# Patient Record
Sex: FEMALE | Race: BLACK OR AFRICAN AMERICAN | Employment: PART TIME | URBAN - METROPOLITAN AREA
[De-identification: names, ages, dates, MRNs, and addresses within clinical notes are randomized per-mention and may not be internally consistent; named-entity substitution may affect disease eponyms.]

---

## 2019-09-02 ENCOUNTER — HOSPITAL ENCOUNTER (EMERGENCY)
Facility: CLINIC | Age: 24
Discharge: HOME | End: 2019-09-02
Attending: EMERGENCY MEDICINE | Admitting: EMERGENCY MEDICINE

## 2019-09-02 VITALS
BODY MASS INDEX: 17.06 KG/M2 | HEART RATE: 46 BPM | OXYGEN SATURATION: 100 % | RESPIRATION RATE: 18 BRPM | SYSTOLIC BLOOD PRESSURE: 109 MMHG | DIASTOLIC BLOOD PRESSURE: 68 MMHG | HEIGHT: 67 IN | TEMPERATURE: 98.3 F | WEIGHT: 108.69 LBS

## 2019-09-02 DIAGNOSIS — G43.001 MIGRAINE WITHOUT AURA AND WITH STATUS MIGRAINOSUS, NOT INTRACTABLE: ICD-10-CM

## 2019-09-02 PROCEDURE — 25000128 H RX IP 250 OP 636: Performed by: EMERGENCY MEDICINE

## 2019-09-02 PROCEDURE — 96375 TX/PRO/DX INJ NEW DRUG ADDON: CPT

## 2019-09-02 PROCEDURE — 25000132 ZZH RX MED GY IP 250 OP 250 PS 637: Performed by: EMERGENCY MEDICINE

## 2019-09-02 PROCEDURE — 99284 EMERGENCY DEPT VISIT MOD MDM: CPT | Mod: 25

## 2019-09-02 PROCEDURE — 96361 HYDRATE IV INFUSION ADD-ON: CPT

## 2019-09-02 PROCEDURE — 96374 THER/PROPH/DIAG INJ IV PUSH: CPT

## 2019-09-02 PROCEDURE — 25800030 ZZH RX IP 258 OP 636: Performed by: EMERGENCY MEDICINE

## 2019-09-02 RX ORDER — KETOROLAC TROMETHAMINE 15 MG/ML
15 INJECTION, SOLUTION INTRAMUSCULAR; INTRAVENOUS ONCE
Status: COMPLETED | OUTPATIENT
Start: 2019-09-02 | End: 2019-09-02

## 2019-09-02 RX ORDER — DIPHENHYDRAMINE HYDROCHLORIDE 50 MG/ML
12.5 INJECTION INTRAMUSCULAR; INTRAVENOUS ONCE
Status: COMPLETED | OUTPATIENT
Start: 2019-09-02 | End: 2019-09-02

## 2019-09-02 RX ORDER — ONDANSETRON 2 MG/ML
4 INJECTION INTRAMUSCULAR; INTRAVENOUS ONCE
Status: COMPLETED | OUTPATIENT
Start: 2019-09-02 | End: 2019-09-02

## 2019-09-02 RX ORDER — ACETAMINOPHEN 500 MG
1000 TABLET ORAL ONCE
Status: COMPLETED | OUTPATIENT
Start: 2019-09-02 | End: 2019-09-02

## 2019-09-02 RX ORDER — METOCLOPRAMIDE HYDROCHLORIDE 5 MG/ML
10 INJECTION INTRAMUSCULAR; INTRAVENOUS ONCE
Status: COMPLETED | OUTPATIENT
Start: 2019-09-02 | End: 2019-09-02

## 2019-09-02 RX ORDER — METOCLOPRAMIDE 10 MG/1
10 TABLET ORAL 3 TIMES DAILY PRN
Qty: 15 TABLET | Refills: 0 | Status: SHIPPED | OUTPATIENT
Start: 2019-09-02

## 2019-09-02 RX ADMIN — METOCLOPRAMIDE 10 MG: 5 INJECTION, SOLUTION INTRAMUSCULAR; INTRAVENOUS at 15:26

## 2019-09-02 RX ADMIN — DIPHENHYDRAMINE HYDROCHLORIDE 12.5 MG: 50 INJECTION, SOLUTION INTRAMUSCULAR; INTRAVENOUS at 15:23

## 2019-09-02 RX ADMIN — ACETAMINOPHEN 1000 MG: 500 TABLET, FILM COATED ORAL at 15:22

## 2019-09-02 RX ADMIN — SODIUM CHLORIDE, POTASSIUM CHLORIDE, SODIUM LACTATE AND CALCIUM CHLORIDE 1000 ML: 600; 310; 30; 20 INJECTION, SOLUTION INTRAVENOUS at 15:26

## 2019-09-02 RX ADMIN — ONDANSETRON 4 MG: 2 INJECTION INTRAMUSCULAR; INTRAVENOUS at 15:25

## 2019-09-02 RX ADMIN — KETOROLAC TROMETHAMINE 15 MG: 15 INJECTION, SOLUTION INTRAMUSCULAR; INTRAVENOUS at 15:24

## 2019-09-02 ASSESSMENT — ENCOUNTER SYMPTOMS
NAUSEA: 1
PHOTOPHOBIA: 1
ARTHRALGIAS: 1
VOMITING: 0
HEADACHES: 1

## 2019-09-02 ASSESSMENT — MIFFLIN-ST. JEOR: SCORE: 1270.63

## 2019-09-02 NOTE — ED PROVIDER NOTES
"  History     Chief Complaint:  Headache    HPI   Neftaly Rehman is a 23 year old female who presents with headache. The patient reported that her \"15/10\" left sided throbbing temporal headache started approximately 4 days ago. She denied any falls or injuries. She stated that she has a history of chronic migraines and although she has not had one for approximately 5 years this feels like a migraine. Occasionally the pain radiates to her forehead and the back of her head. For pain management the patient has taken ibuprofen and Advil which alleviate the pain but do not make it go away and she did not take any today.  Additionally she reports nausea, left shoulder pain and photophobia and denies vomiting or other extremity pain. No numbness, tingling, or weakness. No lethargy, seizures, or other symptoms. No neck pain or stiffness.    Allergies:  Asprin     Medications:    The patient has no known drug allergies.     Past Medical History:    ADD  Migraine   Impingement syndrome shoulder    Past Surgical History:    The patient does not have any pertinent past surgical history.    Family History:    No past pertinent family history.    Social History:  Negative for tobacco use.  Alcohol use endorsed   No marijuana use in the last 30 days   Marital Status:  Single [1]     Review of Systems   Eyes: Positive for photophobia.   Gastrointestinal: Positive for nausea. Negative for vomiting.   Musculoskeletal: Positive for arthralgias.   Neurological: Positive for headaches.   All other systems reviewed and are negative.      Physical Exam     Patient Vitals for the past 24 hrs:   BP Temp Temp src Pulse Heart Rate Resp SpO2 Height Weight   09/02/19 1530 109/68 -- -- (!) 46 -- -- 100 % -- --   09/02/19 1408 119/80 98.3  F (36.8  C) Oral -- (!) 46 18 100 % 1.702 m (5' 7\") 49.3 kg (108 lb 11 oz)       Physical Exam  General: Nontoxic. Heating pad on forehead.   Head:  Scalp, face, and head appear normal, atraumatic, non " tender to palpation. No tenderness over temporal arteries.  Eyes:  Pupils are equal, round, and reactive to light, EOMI, no nystagmus   Conjunctivae non-injected and sclerae white  ENT:    The external nose is normal    Pinnae are normal    The oropharynx is normal, mucous membranes moist    Uvula is in the midline  Neck:  Normal range of motion    There is no rigidity noted    Trachea is in the midline  CV:  Regular rate and rhythm     Normal S1/S2, no S3/S4    No murmur or rub. Radial pulses 2+ bilaterally  Resp:  Lungs are clear and equal bilaterally    There is no tachypnea    No increased work of breathing    No rales, wheezing, or rhonchi  GI:  Abdomen is soft, no rigidity or guarding    No distension, or mass    No tenderness or rebound tenderness   MS:  Normal muscular tone    Symmetric motor strength    No lower extremity edema  Skin:  No rash or acute skin lesions noted  Neuro: A&Ox3, GCS 15    CN II - XII intact    Speech clear, fluent, and normal    Strength 5/5 and symmetric in bilateral upper and lower extremities.    No pronator drift. No leg drift. SILT throughout.    No ankle clonus    FTN testing normal. No tremor.     No meningismus   Psych:  Normal affect.  Appropriate interactions.      Emergency Department Course   Interventions:  1522 Tylenol 1G PO   1523 Benadryl 12.5mg IV  1524 Toradol 15mg IV   1525 Zofran 4mg IV   1526 NS 1L IV    Reglan 10mg IV     Emergency Department Course:  Nursing notes and vitals reviewed. (1442) I performed an exam of the patient as documented above.     Medicine administered as documented above.   (9858) I rechecked the patient and discussed the results of her workup thus far.     Findings and plan explained to the Patient. Patient discharged home with instructions regarding supportive care, medications, and reasons to return. The importance of close follow-up was reviewed. The patient was prescribed Reglan.    I personally reviewed the laboratory results with the  Patient and answered all related questions prior to discharge.     Impression & Plan    Medical Decision Making:  Neftaly Rehman is a 23 year old female who presents to the ED for evaluation of a frontal left sided headache. She does have a prior history of similar headaches. On my evaluation she is well appearing and afebrile. She has a normal neurological examination without any focal neurologic deficits. There is not a history or evidence of trauma or injury. The patient has not had any fever, weakness, numbness, paresthesia, neck stiffness or confusion. Meningitis/encephalitis, intracranial hemorrhage, subarachnoid hemorrhage, CNS tumor, temporal arteritis, idiopathic intracranial hypertension are considered as part of the differential, and considered unlikely given the history and physical examination. At this time there was not an indication for emergent neuroimaging. Symptomatic treatment was administered as noted above and the patient felt improved. At this point I feel the patient's symptoms are due to migraine headache. This and the findings of my evaluation was discussed with the patient. I recommended the patient should follow-up with her primary physician within 3 days if symptoms continue. If the headache continues or the frequency increases, consultation with neurology may be indicated.  Return precautions including increasing pain, fever, vomiting, neck stiffness, syncope, numbness or weakness, were discussed with the patient.  Medications for home prescribed as noted below. All questions were answered prior to the patient's discharge. She was in agreement with the plan of care and she was discharged in stable condition.    Diagnosis:    ICD-10-CM    1. Migraine without aura and with status migrainosus, not intractable G43.001        Disposition:  discharged to home    Discharge Medications:  New Prescriptions    METOCLOPRAMIDE (REGLAN) 10 MG TABLET    Take 1 tablet (10 mg) by mouth 3 times daily  as needed (Headache, Nausea or Vomiting)     Scribe Disclosure:  I, Shaista Joyce, am serving as a scribe on 9/2/2019 at 2:42 PM to personally document services performed by Dean Terry MD based on my observations and the provider's statements to me.     Shaista Joyce  9/2/2019   [facility] EMERGENCY DEPARTMENT       Dean Terry MD  09/03/19 1243

## 2019-09-02 NOTE — ED AVS SNAPSHOT
[facility] Emergency Department  [address]  [facility] [address]  Phone:  [phone]  Fax:  [phone]                                    Neftaly Rehman   MRN: [id number]    Department:  [facility] Emergency Department   Date of Visit:  9/2/2019           After Visit Summary Signature Page    I have received my discharge instructions, and my questions have been answered. I have discussed any challenges I see with this plan with the nurse or doctor.    ..........................................................................................................................................  Patient/Patient Representative Signature      ..........................................................................................................................................  Patient Representative Print Name and Relationship to Patient    ..................................................               ................................................  Date                                   Time    ..........................................................................................................................................  Reviewed by Signature/Title    ...................................................              ..............................................  Date                                               Time          22EPIC Rev 08/18

## 2019-09-02 NOTE — DISCHARGE INSTRUCTIONS
Discharge Instructions  Migraine    You were seen today for a headache that your provider thinks is likely a migraine. At this time your provider does not find that your headache is a sign of anything dangerous or life-threatening.  However, sometimes the signs of serious illness do not show up right away.      Generally, every Emergency Department visit should have a follow-up clinic visit with either a primary or a specialty clinic/provider. Please follow-up as instructed by your emergency provider today.    Return to the Emergency Department if:  You get a fever of 100.4 F or higher.  You get a stiff neck with your headache.  You get a new headache that is different or worse than headaches you have had before.  You are vomiting (throwing up) and cannot keep food or water down.  You have blurry or double vision or other problems with your eyes.  You have a new weakness on one side of your body.  You have difficulty with balance which is new.  You or your family thinks you are confused.  You have a seizure.    Treatment:  Often, treatment for your migraine will take some time to make you headache stop.  Going home to sleep can be very effective.  Use your medications as directed; overuse of medications can actually cause headaches.  Once your headache has gone away, avoid triggers such as certain foods, skipping meals, bright lights, changes in sleep, exercise and stress.  Migraine headaches can have symptoms before the pain starts, like vision changes, funny smells/tastes, dizziness or other symptoms.  Treating a headache as soon as the first symptoms come on is very important and gives the best chance of stopping the headache.  If headaches are severe or frequent, you may need to start daily medication to prevent the headaches.  Carbon monoxide can cause headaches, so not burning things in your home is important.  Also get a carbon monoxide detector.  Some medications for migraines may raise your blood pressure,  so use with caution if you have high blood pressure or heart problems.  If you were given a prescription for medicine here today, be sure to read all of the information (including the package insert) that comes with your prescription.  This will include important information about the medicine, its side effects, and any warnings that you need to know about.  The pharmacist who fills the prescription can provide more information and answer questions you may have about the medicine.  If you have questions or concerns that the pharmacist cannot address, please call or return to the Emergency Department.   Remember that you can always come back to the Emergency Department if you are not able to see your regular provider in the amount of time listed above, if you get any new symptoms, or if there is anything that worries you.

## 2019-09-02 NOTE — LETTER
September 2, 2019      To Whom It May Concern:      Neftaly Rehman was seen in our Emergency Department today, 09/02/19.  I expect her condition to improve over the next 1-2 days.  She may return to work when improved.    Sincerely,        Elvira Barbosa RN

## 2019-09-02 NOTE — ED NOTES
Patient discharged to home. Patient received follow-up with PCP if needed and medication instructions for Reglan. Patient received discharge instructions and has no other questions at this time.

## 2019-09-02 NOTE — ED TRIAGE NOTES
Pt has left sided headache for past 4 days.  Hx of migraine headache but not for several years.  No injury to head.